# Patient Record
Sex: MALE | Race: WHITE | Employment: FULL TIME | URBAN - METROPOLITAN AREA
[De-identification: names, ages, dates, MRNs, and addresses within clinical notes are randomized per-mention and may not be internally consistent; named-entity substitution may affect disease eponyms.]

---

## 2021-02-18 ENCOUNTER — APPOINTMENT (OUTPATIENT)
Dept: CT IMAGING | Age: 66
End: 2021-02-18
Payer: COMMERCIAL

## 2021-02-18 ENCOUNTER — APPOINTMENT (OUTPATIENT)
Dept: GENERAL RADIOLOGY | Age: 66
End: 2021-02-18
Payer: COMMERCIAL

## 2021-02-18 ENCOUNTER — HOSPITAL ENCOUNTER (EMERGENCY)
Age: 66
Discharge: HOME | End: 2021-02-18
Attending: EMERGENCY MEDICINE
Payer: COMMERCIAL

## 2021-02-18 VITALS
WEIGHT: 235 LBS | HEART RATE: 71 BPM | DIASTOLIC BLOOD PRESSURE: 98 MMHG | BODY MASS INDEX: 31.83 KG/M2 | SYSTOLIC BLOOD PRESSURE: 156 MMHG | RESPIRATION RATE: 18 BRPM | HEIGHT: 72 IN | TEMPERATURE: 97.6 F | OXYGEN SATURATION: 96 %

## 2021-02-18 DIAGNOSIS — M79.601 RIGHT ARM PAIN: ICD-10-CM

## 2021-02-18 DIAGNOSIS — S00.01XA ABRASION OF SCALP, INITIAL ENCOUNTER: ICD-10-CM

## 2021-02-18 DIAGNOSIS — S09.90XA INJURY OF HEAD, INITIAL ENCOUNTER: Primary | ICD-10-CM

## 2021-02-18 DIAGNOSIS — M54.2 NECK PAIN: ICD-10-CM

## 2021-02-18 PROCEDURE — 99283 EMERGENCY DEPT VISIT LOW MDM: CPT

## 2021-02-18 PROCEDURE — 70450 CT HEAD/BRAIN W/O DYE: CPT

## 2021-02-18 PROCEDURE — 72125 CT NECK SPINE W/O DYE: CPT

## 2021-02-18 PROCEDURE — 73060 X-RAY EXAM OF HUMERUS: CPT

## 2021-02-18 RX ORDER — ONDANSETRON 4 MG/1
4 TABLET, ORALLY DISINTEGRATING ORAL EVERY 8 HOURS PRN
Qty: 10 TABLET | Refills: 0 | Status: SHIPPED | OUTPATIENT
Start: 2021-02-18

## 2021-02-18 ASSESSMENT — ENCOUNTER SYMPTOMS
TROUBLE SWALLOWING: 0
SHORTNESS OF BREATH: 0
PHOTOPHOBIA: 0
VOMITING: 0
EYE PAIN: 0
COLOR CHANGE: 0
NAUSEA: 1
FACIAL SWELLING: 0
ABDOMINAL PAIN: 0
BACK PAIN: 0
VOICE CHANGE: 0

## 2021-02-18 ASSESSMENT — PAIN SCALES - GENERAL: PAINLEVEL_OUTOF10: 4

## 2021-02-18 NOTE — ED NOTES
Pt slipped on ice and hit the back of his head. Abrasion noted to posterior head. He denies loc. He also states that his right upper arm is sore from catching himself. No other complaints. Ambulated to room without difficulty.       Jose Gloria RN  02/18/21 9735

## 2021-02-18 NOTE — DISCHARGE INSTR - COC
DME ZGNS:759641405}  Toileting  {CHP DME MLHQ:251267091}  Feeding  {CHP DME QGEH:393167565}  Med Admin  {CHP DME TDEO:876064332}  Med Delivery   { [facility] MED Delivery:031455941}    Wound Care Documentation and Therapy:        Elimination:  Continence:   · Bowel: {YES / OS:53734}  · Bladder: {YES / HN:21802}  Urinary Catheter: {Urinary Catheter:026664073}   Colostomy/Ileostomy/Ileal Conduit: {YES / RW:92894}       Date of Last BM: ***  No intake or output data in the 24 hours ending 21 1315  No intake/output data recorded.     Safety Concerns:     [address] [employer] Safety Concerns:623539972}    Impairments/Disabilities:      [address] [employer] Impairments/Disabilities:724982275}    Nutrition Therapy:  Current Nutrition Therapy:   [address] [employer] Diet List:579032693}    Routes of Feeding: {[facility] DME Other Feedings:511448801}  Liquids: {Slp liquid thickness:78461}  Daily Fluid Restriction: {CHP DME Yes amt example:254899930}  Last Modified Barium Swallow with Video (Video Swallowing Test): {Done Not Done YJRO:945701048}    Treatments at the Time of Hospital Discharge:   Respiratory Treatments: ***  Oxygen Therapy:  {Therapy; copd oxygen:40120}  Ventilator:    { CC Vent OXV}    Rehab Therapies: {THERAPEUTIC INTERVENTION:0017667490}  Weight Bearing Status/Restrictions: [employer]  Weight Bearin}  Other Medical Equipment (for information only, NOT a DME order):  {EQUIPMENT:883354034}  Other Treatments: ***    Patient's personal belongings (please select all that are sent with patient):  {[facility] DME Belongings:128768487}    RN SIGNATURE:  {Esignature:879578956}    CASE MANAGEMENT/SOCIAL WORK SECTION    Inpatient Status Date: ***    Readmission Risk Assessment Score:  Readmission Risk              Risk of Unplanned Readmission:        0           Discharging to Facility/ Agency   · Name:   · Address:  · Phone:  · Fax:    Dialysis Facility (if applicable)   · Name:  · Address:  · Dialysis Schedule:  · Phone:  · Fax:    Case Manager/ signature: {Esignature:797082177}    PHYSICIAN SECTION    Prognosis: {Prognosis:7151802366}    Condition at Discharge: [address] [facility] Patient Condition:069581205}    Rehab Potential (if transferring to Rehab): {Prognosis:9412206063}    Recommended Labs or Other Treatments After Discharge: ***    Physician Certification: I certify the above information and transfer of Tomer Henning  is necessary for the continuing treatment of the diagnosis listed and that he requires {Admit to Appropriate Level of Care:33627} for {GREATER/LESS:056534220} 30 days.      Update Admission H&P: {CHP DME Changes in JIVNH:636933003}    PHYSICIAN SIGNATURE:  {Esignature:752608343}

## 2021-02-18 NOTE — ED PROVIDER NOTES
Team [address] ED  eMERGENCY dEPARTMENT eNCOUnter      Pt Name: Andre Sandoval  MRN: [id number]  [place] 1955  Date of evaluation: 2/18/2021  Provider: TREE Del Valle CNP    CHIEF COMPLAINT       Chief Complaint   Patient presents with    Fall     fell approx 1 hour ago. slipped on ice, fell back and hit back of head. denies loc. wife was with pt. wife states he had episode of confusion that lasted almost until now. pt is currently A&Ox4 and ambulated into ER without difficulty    Head Injury     c/o head and neck pain. abrasion to back of head. bleeding controlled    Arm Injury     minor right arm pain from fall. no deformity         HISTORY OF PRESENT ILLNESS  (Location/Symptom, Timing/Onset, Context/Setting, Quality, Duration, Modifying Factors, Severity.)   Andre Sandoval is a 60 y.o. male who presents to the emergency department via private auto for a head injury, nausea, right upper arm pain, neck pain s/p slip and fall on ice this morning. He struck the back of his head on concrete; he has an abrasion. Denies LOC. Wife states he was confused for 30-45 minutes after. He was unable to navigate through his living room to find is usual chair. Denies pain to his back, chest, abdomen, elbow, shoulder, wrist. Rates his pain 4/10 at this time. States his last tetanus shot was <10 years ago. Nursing Notes were reviewed. ALLERGIES     Patient has no known allergies.     CURRENT MEDICATIONS       Discharge Medication List as of 2/18/2021 11:53 AM      CONTINUE these medications which have NOT CHANGED    Details   tamsulosin (FLOMAX) 0.4 MG capsule Take 1 capsule by mouth daily, Disp-30 capsule, R-3      lisinopril (PRINIVIL;ZESTRIL) 10 MG tablet Take 10 mg by mouth daily             PAST MEDICAL HISTORY         Diagnosis Date    Gout     Hypertension     Renal stones        SURGICAL HISTORY           Procedure Laterality Date    BACK SURGERY  2008    laminectomy lumbar    BACK SURGERY  2010    cervical fusion    BACK SURGERY  2011    lumbar fusion    COLONOSCOPY  2009    CYSTOSCOPY      with stent ESWL x 2 procedure    HERNIA REPAIR Right     inguinal hernia    HERNIA REPAIR Left 2013    inguinal hernia x 2 surgeries    KNEE ARTHROSCOPY Right     tear    LITHOTRIPSY Right 3/29/2016    SHOULDER ARTHROSCOPY Right 2005    TONSILLECTOMY           FAMILY HISTORY     History reviewed. No pertinent family history. No family status information on file. SOCIAL HISTORY      reports that he has quit smoking. He has never used smokeless tobacco. He reports current alcohol use. He reports that he does not use drugs. REVIEW OF SYSTEMS    (2-9 systems for level 4, 10 or more for level 5)     Review of Systems   Constitutional: Negative for chills, diaphoresis, fatigue and fever. HENT: Negative for facial swelling, nosebleeds, trouble swallowing and voice change. Eyes: Negative for photophobia, pain and visual disturbance. Respiratory: Negative for shortness of breath. Cardiovascular: Negative for chest pain. Gastrointestinal: Positive for nausea. Negative for abdominal pain and vomiting. Genitourinary: Negative for difficulty urinating. Musculoskeletal: Positive for myalgias and neck pain. Negative for arthralgias, back pain and gait problem. Skin: Positive for wound. Negative for color change and rash. Neurological: Positive for headaches. Negative for dizziness, syncope, facial asymmetry, speech difficulty, weakness, light-headedness and numbness. Psychiatric/Behavioral: Positive for confusion. Except as noted above the remainder of the review of systems was reviewed and negative.      PHYSICAL EXAM    (up to 7 for level 4, 8 or more for level 5)     ED Triage Vitals   BP Temp Temp Source Pulse Resp SpO2 Height Weight   02/18/21 1015 02/18/21 1013 02/18/21 1013 02/18/21 1013 02/18/21 1013 02/18/21 1013 02/18/21 1013 02/18/21 1013   (!) 156/98 97.6 °F weakness. Coordination: Coordination is intact. Gait: Gait is intact. Psychiatric:         Behavior: Behavior normal.         DIAGNOSTIC RESULTS     RADIOLOGY:   Non-plain film images such as CT, Ultrasound and MRI are read by the radiologist. Modesta Bean radiographic images are visualized and preliminarily interpreted by the emergency physician with the below findings:    Interpretation per the Radiologist below, if available at the time of this note:    Xr Humerus Right (min 2 Views)    Result Date: 2/18/2021  EXAMINATION: TWO XRAY VIEWS OF THE RIGHT HUMERUS 2/18/2021 7:34 am COMPARISON: None. HISTORY: ORDERING SYSTEM PROVIDED HISTORY: pain, fall TECHNOLOGIST PROVIDED HISTORY: pain, fall Acuity: Acute Type of Exam: Initial FINDINGS: The humerus appears intact, and the humeral head is appropriately seated in the glenoid fossa. There is sequelae of chronic medial epicondylitis. Mild glenohumeral and moderate acromioclavicular joint degenerative changes. On the lateral projection there is a slightly irregular appearance of the proximal ulna which may be projectional.     No acute fracture or dislocation of the right humerus. Slightly irregular appearance of the proximal ulna on the lateral view which may be projectional.  Correlate for any elbow pain, and if present recommend dedicated elbow radiographs. Ct Head Wo Contrast    Result Date: 2/18/2021  EXAMINATION: CT OF THE CERVICAL SPINE WITHOUT CONTRAST; CT OF THE HEAD WITHOUT CONTRAST 2/18/2021 10:52 am TECHNIQUE: CT of the cervical spine was performed without the administration of intravenous contrast. Multiplanar reformatted images are provided for review.  Dose modulation, iterative reconstruction, and/or weight based adjustment of the mA/kV was utilized to reduce the radiation dose to as low as reasonably achievable.; CT of the head was performed without the administration of intravenous contrast. Dose modulation, iterative reconstruction, and/or weight based adjustment of the mA/kV was utilized to reduce the radiation dose to as low as reasonably achievable. COMPARISON: None. HISTORY: ORDERING SYSTEM PROVIDED HISTORY: fall TECHNOLOGIST PROVIDED HISTORY: fall Decision Support Exception->Emergency Medical Condition (MA) Reason for Exam: Pt fell on ice, abrasion to back of head. No LOC, pts wife states he was having confusion after fall Acuity: Acute Type of Exam: Initial CT HEAD FINDINGS: BRAIN/VENTRICLES: There is no acute intracranial hemorrhage, mass effect or midline shift. No abnormal extra-axial fluid collection. The gray-white differentiation is maintained without evidence of an acute infarct. There is no evidence of hydrocephalus. ORBITS: The visualized portion of the orbits demonstrate no acute abnormality. SINUSES: The visualized paranasal sinuses and mastoid air cells demonstrate no acute abnormality. SOFT TISSUES/SKULL:  No acute abnormality of the visualized skull or soft tissues. CT CERVICAL SPINE FINDINGS: BONES/ALIGNMENT: No evidence of an acute cervical spine fracture. Grade 1 degenerative anterolisthesis C2 on C3, C3 on C4, C4 on C5 and C7 on T1. Status post C5-7 ACDF without hardware failure or malalignment evident. DEGENERATIVE CHANGES: Diffuse mild degenerative changes. No cervical spinal canal stenosis evident. SOFT TISSUES: There is no prevertebral soft tissue swelling. 1. CT HEAD: No acute intracranial abnormality. 2. CT CERVICAL SPINE: No acute abnormality of the cervical spine. 3. Grade 1 degenerative anterolisthesis C2 on C3, C3 on C4, C4 on C5 and C7 on T1. 4. Mild degenerative changes cervical spine. 5. Sequela from C5-7 ACDF. *Note that if pain persists or worsens, or if clinically there is concern for CT occult acute cervical abnormality, flexion/extension C-spine series or MRI cervical spine may be considered for additional evaluation.      Ct Cervical Spine Wo Contrast    Result Date: 2/18/2021  EXAMINATION: CT OF THE CERVICAL SPINE WITHOUT CONTRAST; CT OF THE HEAD WITHOUT CONTRAST 2/18/2021 10:52 am TECHNIQUE: CT of the cervical spine was performed without the administration of intravenous contrast. Multiplanar reformatted images are provided for review. Dose modulation, iterative reconstruction, and/or weight based adjustment of the mA/kV was utilized to reduce the radiation dose to as low as reasonably achievable.; CT of the head was performed without the administration of intravenous contrast. Dose modulation, iterative reconstruction, and/or weight based adjustment of the mA/kV was utilized to reduce the radiation dose to as low as reasonably achievable. COMPARISON: None. HISTORY: ORDERING SYSTEM PROVIDED HISTORY: fall TECHNOLOGIST PROVIDED HISTORY: fall Decision Support Exception->Emergency Medical Condition (MA) Reason for Exam: Pt fell on ice, abrasion to back of head. No LOC, pts wife states he was having confusion after fall Acuity: Acute Type of Exam: Initial CT HEAD FINDINGS: BRAIN/VENTRICLES: There is no acute intracranial hemorrhage, mass effect or midline shift. No abnormal extra-axial fluid collection. The gray-white differentiation is maintained without evidence of an acute infarct. There is no evidence of hydrocephalus. ORBITS: The visualized portion of the orbits demonstrate no acute abnormality. SINUSES: The visualized paranasal sinuses and mastoid air cells demonstrate no acute abnormality. SOFT TISSUES/SKULL:  No acute abnormality of the visualized skull or soft tissues. CT CERVICAL SPINE FINDINGS: BONES/ALIGNMENT: No evidence of an acute cervical spine fracture. Grade 1 degenerative anterolisthesis C2 on C3, C3 on C4, C4 on C5 and C7 on T1. Status post C5-7 ACDF without hardware failure or malalignment evident. DEGENERATIVE CHANGES: Diffuse mild degenerative changes. No cervical spinal canal stenosis evident. SOFT TISSUES: There is no prevertebral soft tissue swelling.      1. CT HEAD: No acute intracranial abnormality. 2. CT CERVICAL SPINE: No acute abnormality of the cervical spine. 3. Grade 1 degenerative anterolisthesis C2 on C3, C3 on C4, C4 on C5 and C7 on T1. 4. Mild degenerative changes cervical spine. 5. Sequela from C5-7 ACDF. *Note that if pain persists or worsens, or if clinically there is concern for CT occult acute cervical abnormality, flexion/extension C-spine series or MRI cervical spine may be considered for additional evaluation. EMERGENCY DEPARTMENT COURSE and DIFFERENTIAL DIAGNOSIS/MDM:   Vitals:    Vitals:    02/18/21 1013 02/18/21 1015   BP:  (!) 156/98   Pulse: 71    Resp: 18    Temp: 97.6 °F (36.4 °C)    TempSrc: Oral    SpO2: 96%    Weight: 235 lb (106.6 kg)    Height: 6' (1.829 m)        CLINICAL DECISION MAKING:  The patient presented alert with a nontoxic appearance and was seen in conjunction with Dr. Jordy Cha. Imaging was negative for acute findings. His wound was cleansed. A prescription was written for Zofran. Follow up with pcp, return to ED if condition worsens. He was discharged to family. Adult Head Trauma > 25years of age:   A head CT was ordered by an emergency care provider, and some of the indications for ordering the head CT included:     #415 - Emergency Medicine: Emergency Department Utilization of CT for Minor Blunt Head Trauma (Adults)   1.  A head CT was ordered due to (select 1):  []Aged 65 years or older []GCS less than 15 []Focal neurological deficit [x]Severe headache []Vomiting   []Dangerous mechanism of injury []Physical signs of basilar skull fracture []Patient suspected of taking anticoagulant medication []Thrombocytopenia []Coagulopathy   OR  Select either:     []Loss of Consciousness       OR        []Post-traumatic Amnesia  AND 1 indication below:   [x]Headache [x]Short term memory deficits []Drug/alcohol intoxication [x]Evidence of trauma above the clavicles []Age 60 years or older []Post-traumatic seizure   If Answered, Stop Here    2. Measure exclusions - Click all diagnoses that apply:  []Ventricular shunt []Brain tumor []Pregnant   []Multi-system trauma []Patient is taking antiplatelet medication (excluding aspirin)   If Answered, Stop Here       FINAL IMPRESSION      1. Injury of head, initial encounter    2. Abrasion of scalp, initial encounter    3. Neck pain    4.  Right arm pain              DISPOSITION/PLAN   DISPOSITION Decision To Discharge 02/18/2021 11:52:06 AM      PATIENT REFERRED TO:   Sterling Clements MD  [address] [place] [address]  [phone]    Schedule an appointment as soon as possible for a visit       [facility]  [address]  [phone]    If symptoms worsen, As needed      DISCHARGE MEDICATIONS:     Discharge Medication List as of 2/18/2021 11:53 AM      START taking these medications    Details   ondansetron (ZOFRAN ODT) 4 MG disintegrating tablet Take 1 tablet by mouth every 8 hours as needed for Nausea or Vomiting, Disp-10 tablet, R-0Print                 (Please note that portions of this note were completed with a voice recognition program.  Efforts were made to edit the dictations but occasionally words are mis-transcribed.)    TREE Pike CNP, APRN - CNP  02/18/21 0941

## 2021-02-20 NOTE — ED PROVIDER NOTES
eMERGENCY dEPARTMENT eNCOUnter   Independent Attestation     Pt Name: Ketty Ivey  MRN: [id number]  [place] 1955  Date of evaluation: 2/20/21     Ketty Ivey is a 60 y.o. male with CC: Fall (fell approx 1 hour ago. slipped on ice, fell back and hit back of head. denies loc. wife was with pt. wife states he had episode of confusion that lasted almost until now. pt is currently A&Ox4 and ambulated into ER without difficulty), Head Injury (c/o head and neck pain. abrasion to back of head. bleeding controlled), and Arm Injury (minor right arm pain from fall. no deformity)      Based on the medical record the care appears appropriate. I was personally available for consultation in the Emergency Department.     Jesse Green MD  Attending Emergency Physician                    Jesse Green MD  02/20/21 7050

## 2022-01-10 ENCOUNTER — HOSPITAL ENCOUNTER (EMERGENCY)
Age: 67
Discharge: HOME | End: 2022-01-10
Attending: EMERGENCY MEDICINE
Payer: COMMERCIAL

## 2022-01-10 VITALS
TEMPERATURE: 98.2 F | HEIGHT: 72 IN | DIASTOLIC BLOOD PRESSURE: 98 MMHG | RESPIRATION RATE: 16 BRPM | HEART RATE: 87 BPM | SYSTOLIC BLOOD PRESSURE: 157 MMHG | OXYGEN SATURATION: 99 % | BODY MASS INDEX: 35.21 KG/M2 | WEIGHT: 260 LBS

## 2022-01-10 DIAGNOSIS — M1A.0720 CHRONIC GOUT OF LEFT FOOT, UNSPECIFIED CAUSE: Primary | ICD-10-CM

## 2022-01-10 PROCEDURE — 96372 THER/PROPH/DIAG INJ SC/IM: CPT

## 2022-01-10 PROCEDURE — 6370000000 HC RX 637 (ALT 250 FOR IP): Performed by: EMERGENCY MEDICINE

## 2022-01-10 PROCEDURE — 99283 EMERGENCY DEPT VISIT LOW MDM: CPT

## 2022-01-10 PROCEDURE — 6360000002 HC RX W HCPCS: Performed by: EMERGENCY MEDICINE

## 2022-01-10 RX ORDER — METOPROLOL SUCCINATE 25 MG/1
25 TABLET, EXTENDED RELEASE ORAL DAILY
COMMUNITY
End: 2022-10-13 | Stop reason: SDUPTHER

## 2022-01-10 RX ORDER — KETOROLAC TROMETHAMINE 30 MG/ML
30 INJECTION, SOLUTION INTRAMUSCULAR; INTRAVENOUS ONCE
Status: COMPLETED | OUTPATIENT
Start: 2022-01-10 | End: 2022-01-10

## 2022-01-10 RX ORDER — DULOXETIN HYDROCHLORIDE 20 MG/1
20 CAPSULE, DELAYED RELEASE ORAL DAILY
COMMUNITY

## 2022-01-10 RX ORDER — PREDNISONE 50 MG/1
50 TABLET ORAL DAILY
Qty: 5 TABLET | Refills: 0 | Status: SHIPPED | OUTPATIENT
Start: 2022-01-10 | End: 2022-01-15

## 2022-01-10 RX ORDER — ACETAMINOPHEN 500 MG
1000 TABLET ORAL ONCE
Status: COMPLETED | OUTPATIENT
Start: 2022-01-10 | End: 2022-01-10

## 2022-01-10 RX ORDER — PREDNISONE 20 MG/1
50 TABLET ORAL ONCE
Status: COMPLETED | OUTPATIENT
Start: 2022-01-10 | End: 2022-01-10

## 2022-01-10 RX ADMIN — KETOROLAC TROMETHAMINE 30 MG: 30 INJECTION, SOLUTION INTRAMUSCULAR; INTRAVENOUS at 05:50

## 2022-01-10 RX ADMIN — ACETAMINOPHEN 1000 MG: 500 TABLET ORAL at 05:49

## 2022-01-10 RX ADMIN — PREDNISONE 50 MG: 20 TABLET ORAL at 05:49

## 2022-01-10 ASSESSMENT — PAIN SCALES - GENERAL: PAINLEVEL_OUTOF10: 9

## 2022-01-10 NOTE — ED PROVIDER NOTES
EMERGENCY DEPARTMENT ENCOUNTER    Pt Name: Cornelius Awad  MRN: [id number]  [place] 1955  Date of evaluation: 1/10/22  CHIEF COMPLAINT       Chief Complaint   Patient presents with    Foot Pain     treated for gout    Leg Swelling     HISTORY OF PRESENT ILLNESS   Patient is a 78-year-old male with PMH of gout who presents to the ED complaining of pain and swelling to left foot secondary to gout attack. Symptoms started approximately 9 weeks ago. He took a course of steroids with moderate improvement. He reports having gout in both legs, right foot has healed however left foot will not resolve completely. No other issues at this time. ROS:  No fevers, cough, shortness of breath, chest pain, abdominal pain, nausea, vomiting, changes in urine or stool. REVIEW OF SYSTEMS     Review of Systems   All other systems reviewed and are negative.     PASTMEDICAL HISTORY     Past Medical History:   Diagnosis Date    Gout     Hypertension     Renal stones      SURGICAL HISTORY       Past Surgical History:   Procedure Laterality Date    BACK SURGERY  2008    laminectomy lumbar    BACK SURGERY  2010    cervical fusion    BACK SURGERY  2011    lumbar fusion    COLONOSCOPY  2009    CYSTOSCOPY      with stent ESWL x 2 procedure    HERNIA REPAIR Right     inguinal hernia    HERNIA REPAIR Left 2013    inguinal hernia x 2 surgeries    KNEE ARTHROSCOPY Right     tear    LITHOTRIPSY Right 3/29/2016    SHOULDER ARTHROSCOPY Right 2005    TONSILLECTOMY       CURRENT MEDICATIONS       Previous Medications    ALLOPURINOL PO    Take by mouth    DULOXETINE (CYMBALTA) 20 MG EXTENDED RELEASE CAPSULE    Take 20 mg by mouth daily    LISINOPRIL (PRINIVIL;ZESTRIL) 10 MG TABLET    Take 10 mg by mouth daily    METOPROLOL SUCCINATE (TOPROL XL) 25 MG EXTENDED RELEASE TABLET    Take 25 mg by mouth daily    MULTIPLE VITAMINS-MINERALS (MULTIVITAL PO)    Take 1 tablet by mouth daily    NONFORMULARY    tropsium    ONDANSETRON (ZOFRAN ODT) 4 MG DISINTEGRATING TABLET    Take 1 tablet by mouth every 8 hours as needed for Nausea or Vomiting    TAMSULOSIN (FLOMAX) 0.4 MG CAPSULE    Take 1 capsule by mouth daily     ALLERGIES     has No Known Allergies. FAMILY HISTORY     has no family status information on file. SOCIAL HISTORY       Social History     Tobacco Use    Smoking status: Former Smoker    Smokeless tobacco: Never Used   Substance Use Topics    Alcohol use: Yes     Comment: social    Drug use: No     PHYSICAL EXAM     INITIAL VITALS: BP (!) 157/98   Pulse 87   Temp 98.2 °F (36.8 °C) (Oral)   Resp 16   Ht 6' (1.829 m)   Wt 260 lb (117.9 kg)   SpO2 99%   BMI 35.26 kg/m²    Physical Exam  HENT:      Head: Normocephalic. Right Ear: External ear normal.      Left Ear: External ear normal.      Nose: Nose normal.   Eyes:      Conjunctiva/sclera: Conjunctivae normal.   Cardiovascular:      Rate and Rhythm: Normal rate. Pulmonary:      Effort: Pulmonary effort is normal.   Abdominal:      General: Abdomen is flat. Skin:     General: Skin is dry. Comments: Swollen, tender, left foot primarily over medial aspect of first digit and arch. Neurological:      Mental Status: He is alert. Mental status is at baseline. Psychiatric:         Mood and Affect: Mood normal.         Behavior: Behavior normal.         MEDICAL DECISION MAKING:   The patient is hemodynamically stable, afebrile, nontoxic-appearing. Physical exam notable for tenderness, swelling medial aspect left foot. Based on history and exam likely acute on chronic gout. ED plan for analgesia, steroids, discharge.            DIAGNOSTIC RESULTS   EKG:All EKG's are interpreted by the Emergency Department Physician who either signs or Co-signs this chart in the absence of a cardiologist.        RADIOLOGY:All plain film, CT, MRI, and formal ultrasound images (except ED bedside ultrasound) are read by the radiologist, see reports below, unless otherwisenoted in [facility] or here. No orders to display     LABS: All lab results were reviewed by myself, and all abnormals are listed below. Labs Reviewed - No data to display    EMERGENCY DEPARTMENTCOURSE:   Patient did well in the ED. Given Toradol, prednisone, Tylenol in the ED. Given Rx for prednisone. Given referral to podiatry. No further work-up indicated at this time. Nursing notes reviewed. At this time this is what I find, the patient appears well and does not appear sick or toxic. I gave my usual and customary discussion of the risks and benefits of discharge versus admission. I answered the patient's questions. I gave the patient strict return precautions. Patient expressed understanding of the discharge instructions. The care is provided during an unprecedented national emergency due to the novel coronavirus, COVID-19. Dictated but not reviewed. Vitals:    Vitals:    01/10/22 0442 01/10/22 0448 01/10/22 0450   BP:   (!) 157/98   Pulse: 87     Resp: 16     Temp:  98.2 °F (36.8 °C)    TempSrc:  Oral    SpO2: 99%     Weight: 260 lb (117.9 kg)     Height: 6' (1.829 m)         The patient was given the following medications while in the emergency department:  Orders Placed This Encounter   Medications    predniSONE (DELTASONE) tablet 50 mg    ketorolac (TORADOL) injection 30 mg    acetaminophen (TYLENOL) tablet 1,000 mg    predniSONE (DELTASONE) 50 MG tablet     Sig: Take 1 tablet by mouth daily for 5 days     Dispense:  5 tablet     Refill:  0     CONSULTS:  None    FINAL IMPRESSION      1.  Chronic gout of left foot, unspecified cause          DISPOSITION/PLAN   DISPOSITION Decision To Discharge 01/10/2022 05:31:26 AM      PATIENT REFERRED TO:  Noe Michelle DPM  [address] [facility] [address] [phone]    In 2 days      DISCHARGE MEDICATIONS:  New Prescriptions    PREDNISONE (DELTASONE) 50 MG TABLET    Take 1 tablet by mouth daily for 5 days     Carlos Eduardo Bateamn MD  Attending Emergency Physician                   Ender Helton MD  01/10/22 9090

## 2022-10-13 ENCOUNTER — HOSPITAL ENCOUNTER (EMERGENCY)
Age: 67
Discharge: HOME | End: 2022-10-13
Attending: EMERGENCY MEDICINE
Payer: COMMERCIAL

## 2022-10-13 VITALS
TEMPERATURE: 97.6 F | HEART RATE: 73 BPM | DIASTOLIC BLOOD PRESSURE: 116 MMHG | SYSTOLIC BLOOD PRESSURE: 159 MMHG | BODY MASS INDEX: 29.8 KG/M2 | HEIGHT: 72 IN | OXYGEN SATURATION: 95 % | WEIGHT: 220 LBS | RESPIRATION RATE: 16 BRPM

## 2022-10-13 DIAGNOSIS — I10 HYPERTENSION, UNSPECIFIED TYPE: Primary | ICD-10-CM

## 2022-10-13 LAB
ABSOLUTE EOS #: 0.11 K/UL (ref 0–0.44)
ABSOLUTE IMMATURE GRANULOCYTE: 0.05 K/UL (ref 0–0.3)
ABSOLUTE LYMPH #: 2.27 K/UL (ref 1.1–3.7)
ABSOLUTE MONO #: 0.68 K/UL (ref 0.1–1.2)
ANION GAP SERPL CALCULATED.3IONS-SCNC: 13 MMOL/L (ref 9–17)
BASOPHILS # BLD: 1 % (ref 0–2)
BASOPHILS ABSOLUTE: 0.06 K/UL (ref 0–0.2)
BUN BLDV-MCNC: 12 MG/DL (ref 8–23)
BUN/CREAT BLD: 11 (ref 9–20)
CALCIUM SERPL-MCNC: 10.2 MG/DL (ref 8.6–10.4)
CHLORIDE BLD-SCNC: 96 MMOL/L (ref 98–107)
CO2: 24 MMOL/L (ref 20–31)
CREAT SERPL-MCNC: 1.09 MG/DL (ref 0.7–1.2)
EOSINOPHILS RELATIVE PERCENT: 2 % (ref 1–4)
GFR SERPL CREATININE-BSD FRML MDRD: >60 ML/MIN/1.73M2
GLUCOSE BLD-MCNC: 86 MG/DL (ref 70–99)
HCT VFR BLD CALC: 41.8 % (ref 40.7–50.3)
HEMOGLOBIN: 13.6 G/DL (ref 13–17)
IMMATURE GRANULOCYTES: 1 %
LYMPHOCYTES # BLD: 32 % (ref 24–43)
MCH RBC QN AUTO: 33.8 PG (ref 25.2–33.5)
MCHC RBC AUTO-ENTMCNC: 32.5 G/DL (ref 28.4–34.8)
MCV RBC AUTO: 104 FL (ref 82.6–102.9)
MONOCYTES # BLD: 10 % (ref 3–12)
NRBC AUTOMATED: 0 PER 100 WBC
PDW BLD-RTO: 13.2 % (ref 11.8–14.4)
PLATELET # BLD: 212 K/UL (ref 138–453)
PMV BLD AUTO: 10.6 FL (ref 8.1–13.5)
POTASSIUM SERPL-SCNC: 4.3 MMOL/L (ref 3.7–5.3)
RBC # BLD: 4.02 M/UL (ref 4.21–5.77)
RBC # BLD: ABNORMAL 10*6/UL
SEG NEUTROPHILS: 54 % (ref 36–65)
SEGMENTED NEUTROPHILS ABSOLUTE COUNT: 3.94 K/UL (ref 1.5–8.1)
SODIUM BLD-SCNC: 133 MMOL/L (ref 135–144)
WBC # BLD: 7.1 K/UL (ref 3.5–11.3)

## 2022-10-13 PROCEDURE — 93005 ELECTROCARDIOGRAM TRACING: CPT | Performed by: PHYSICIAN ASSISTANT

## 2022-10-13 PROCEDURE — 80048 BASIC METABOLIC PNL TOTAL CA: CPT

## 2022-10-13 PROCEDURE — 99284 EMERGENCY DEPT VISIT MOD MDM: CPT

## 2022-10-13 PROCEDURE — 6370000000 HC RX 637 (ALT 250 FOR IP): Performed by: PHYSICIAN ASSISTANT

## 2022-10-13 PROCEDURE — 85025 COMPLETE CBC W/AUTO DIFF WBC: CPT

## 2022-10-13 PROCEDURE — 2500000003 HC RX 250 WO HCPCS: Performed by: PHYSICIAN ASSISTANT

## 2022-10-13 PROCEDURE — 96374 THER/PROPH/DIAG INJ IV PUSH: CPT

## 2022-10-13 RX ORDER — METOPROLOL SUCCINATE 25 MG/1
25 TABLET, EXTENDED RELEASE ORAL DAILY
Qty: 30 TABLET | Refills: 0 | Status: SHIPPED | OUTPATIENT
Start: 2022-10-13

## 2022-10-13 RX ORDER — ATORVASTATIN CALCIUM 80 MG/1
80 TABLET, FILM COATED ORAL DAILY
COMMUNITY

## 2022-10-13 RX ORDER — FENOFIBRATE 145 MG/1
145 TABLET, COATED ORAL DAILY
COMMUNITY

## 2022-10-13 RX ORDER — METOPROLOL TARTRATE 5 MG/5ML
5 INJECTION INTRAVENOUS ONCE
Status: COMPLETED | OUTPATIENT
Start: 2022-10-13 | End: 2022-10-13

## 2022-10-13 RX ORDER — HYDROCHLOROTHIAZIDE 25 MG/1
25 TABLET ORAL DAILY
COMMUNITY

## 2022-10-13 RX ADMIN — METOPROLOL TARTRATE 5 MG: 1 INJECTION, SOLUTION INTRAVENOUS at 16:54

## 2022-10-13 RX ADMIN — METOPROLOL TARTRATE 50 MG: 25 TABLET, FILM COATED ORAL at 19:07

## 2022-10-13 ASSESSMENT — PAIN SCALES - GENERAL: PAINLEVEL_OUTOF10: 8

## 2022-10-13 ASSESSMENT — PAIN - FUNCTIONAL ASSESSMENT
PAIN_FUNCTIONAL_ASSESSMENT: NONE - DENIES PAIN
PAIN_FUNCTIONAL_ASSESSMENT: 0-10

## 2022-10-13 ASSESSMENT — PAIN DESCRIPTION - ORIENTATION: ORIENTATION: LEFT

## 2022-10-13 ASSESSMENT — PAIN DESCRIPTION - LOCATION: LOCATION: SHOULDER

## 2022-10-13 NOTE — ED PROVIDER NOTES
eMERGENCY dEPARTMENT eNCOUnter   Independent Attestation     Pt Name: Abdelrahman Magallon  MRN: [id number]  [place] 1955  Date of evaluation: 10/13/22     Abdelrahman Magallon is a 78 y.o. male with CC: Hypertension (Was taken off BP meds last month with PCP. Has been monitoring BP at home and it has been . States the last week or so at doctor's appts, it has been increased 140s/90s. )        This visit was performed by both a physician and an APC. I performed all aspects of the MDM as documented.       The care is provided during an unprecedented national emergency due to the novel coronavirus, Sonia Garcia MD  Attending Emergency Physician            Lisa Rasmussen MD  10/13/22 1828

## 2022-10-13 NOTE — ED PROVIDER NOTES
[address] [place] [address] ED  [facility]      Pt Name: Yadira Urbina  MRN: [id number]  [place] 1955  Date ofevaluation: 10/13/2022  Provider: Najma Smith PA-C    CHIEF COMPLAINT       Chief Complaint   Patient presents with    Hypertension     Was taken off BP meds last month with PCP. Has been monitoring BP at home and it has been . States the last week or so at doctor's appts, it has been increased 140s/90s. HISTORY OF PRESENT ILLNESS  (Location/Symptom, Timing/Onset, Context/Setting, Quality, Duration, Modifying Factors, Severity.)   Yadira Urbina is a 78 y.o. male who presents to the emergency department with high blood pressure. Patient states he was taken off his metoprolol a couple of months ago. He was seen at the preop area for foot surgery was blood pressure was too high was sent to the ER. Patient denies any chest pain shortness of breath. No nausea vomiting. No fevers or chills. No other complaints. Nursing Notes were reviewed. ALLERGIES     Patient has no known allergies.     CURRENT MEDICATIONS       Discharge Medication List as of 10/13/2022  6:30 PM        CONTINUE these medications which have NOT CHANGED    Details   atorvastatin (LIPITOR) 80 MG tablet Take 80 mg by mouth dailyHistorical Med      fenofibrate (TRICOR) 145 MG tablet Take 145 mg by mouth dailyHistorical Med      hydroCHLOROthiazide (HYDRODIURIL) 25 MG tablet Take 25 mg by mouth dailyHistorical Med      DULoxetine (CYMBALTA) 20 MG extended release capsule Take 20 mg by mouth dailyHistorical Med      Multiple Vitamins-Minerals (MULTIVITAL PO) Take 1 tablet by mouth dailyHistorical Med      ALLOPURINOL PO Take by mouthHistorical Med      NONFORMULARY tropsiumHistorical Med      ondansetron (ZOFRAN ODT) 4 MG disintegrating tablet Take 1 tablet by mouth every 8 hours as needed for Nausea or Vomiting, Disp-10 tablet, R-0Print      tamsulosin (FLOMAX) 0.4 MG capsule Take 1 capsule by mouth daily, Disp-30 capsule, R-3      lisinopril (PRINIVIL;ZESTRIL) 10 MG tablet Take 10 mg by mouth daily             PAST MEDICAL HISTORY         Diagnosis Date    Gout     Hypertension     Renal stones        SURGICAL HISTORY           Procedure Laterality Date    BACK SURGERY  2008    laminectomy lumbar    BACK SURGERY  2010    cervical fusion    BACK SURGERY  2011    lumbar fusion    COLONOSCOPY  2009    CYSTOSCOPY      with stent ESWL x 2 procedure    HERNIA REPAIR Right     inguinal hernia    HERNIA REPAIR Left 2013    inguinal hernia x 2 surgeries    KNEE ARTHROSCOPY Right     tear    LITHOTRIPSY Right 3/29/2016    SHOULDER ARTHROSCOPY Right 2005    TONSILLECTOMY           HISTORY     History reviewed. No pertinent family history. No family status information on file. SOCIAL HISTORY      reports that he has quit smoking. He has never used smokeless tobacco. He reports current alcohol use. He reports that he does not use drugs. REVIEW OFSYSTEMS    (2-9 systems for level 4, 10 or more for level 5)   Review of Systems    Except as noted above the remainder of the review of systems was reviewed and negative. PHYSICAL EXAM    (up to 7 for level 4, 8 or more for level 5)     ED Triage Vitals [10/13/22 1547]   BP Temp Temp Source Heart Rate Resp SpO2 Height Weight   (!) 191/132 97.6 °F (36.4 °C) Oral 97 16 100 % 6' (1.829 m) 220 lb (99.8 kg)     Physical Exam  Constitutional:       Appearance: He is well-developed. HENT:      Head: Normocephalic and atraumatic. Cardiovascular:      Rate and Rhythm: Normal rate and regular rhythm. Pulmonary:      Effort: Pulmonary effort is normal.      Breath sounds: Normal breath sounds. Abdominal:      Palpations: Abdomen is soft. Musculoskeletal:         General: Normal range of motion. Left shoulder: Tenderness present. Cervical back: Normal range of motion and neck supple. Skin:     General: Skin is warm.    Neurological:      Mental Status: He is alert and oriented to person, place, and time. Psychiatric:         Behavior: Behavior normal.               DIAGNOSTIC RESULTS     EKG: All EKG's are interpreted by the Emergency Department Physician who either signs or Co-signs this chart in the absence of a cardiologist.        RADIOLOGY:   Non-plain film images such as CT, Ultrasound and MRI are read by the radiologist. Plain radiographic images arevisualized and preliminarily interpreted by the emergency physician with the below findings:        Interpretation per the Radiologist below, if available at thetime of this note:          ED BEDSIDE ULTRASOUND:   Performed by ED Physician - none    LABS:  Labs Reviewed   CBC WITH AUTO DIFFERENTIAL - Abnormal; Notable for the following components:       Result Value    RBC 4.02 (*)     .0 (*)     MCH 33.8 (*)     Immature Granulocytes 1 (*)     All other components within normal limits   BASIC METABOLIC PANEL - Abnormal; Notable for the following components:    Sodium 133 (*)     Chloride 96 (*)     All other components within normal limits       All other labs were within normal range or not returned as of this dictation. EMERGENCY DEPARTMENT COURSE and DIFFERENTIAL DIAGNOSIS/MDM:   Vitals:    Vitals:    10/13/22 1730 10/13/22 1745 10/13/22 1830 10/13/22 1900   BP: (!) 165/114 (!) 160/119 (!) 166/113 (!) 159/116   Pulse: 69 68 69 73   Resp: (!) 9 12 (!) 8 16   Temp:       TempSrc:       SpO2: 95%      Weight:       Height:       Has chronic left shoulder pain secondary to recent rotator cuff injury. Pain reproducible with palpation and movement. Blood pressure improved after Lopressor. We will restart metoprolol discharged home outpatient follow-up. Pre-hypertension/Hypertension: The patient has been informed that they may have pre-hypertension or Hypertension based on a blood pressure reading in the emergency department.  I recommend that the patient call the primary care provider listed on their discharge instructions or a physician of their choice this week to arrange follow up for further evaluation of possible pre-hypertension or Hypertension. CONSULTS:  None    PROCEDURES:  Procedures        FINAL IMPRESSION      1.  Hypertension, unspecified type          DISPOSITION/PLAN   DISPOSITION Decision To Discharge 10/13/2022 06:28:58 PM      PATIENTREFERRED TO:   Padmini Callahan MD  [address] [place] [address] [phone]    In 3 days      DISCHARGE MEDICATIONS:     Discharge Medication List as of 10/13/2022  6:30 PM              (Please note that portions of this note were completed with a voice recognition program.  Efforts were made to edit thedictations but occasionally words are mis-transcribed.)    YOSEF Garcia PA-C  10/13/22 1932

## 2022-10-14 LAB
EKG ATRIAL RATE: 95 BPM
EKG P AXIS: 0 DEGREES
EKG P-R INTERVAL: 164 MS
EKG Q-T INTERVAL: 384 MS
EKG QRS DURATION: 96 MS
EKG QTC CALCULATION (BAZETT): 482 MS
EKG R AXIS: -6 DEGREES
EKG T AXIS: 32 DEGREES
EKG VENTRICULAR RATE: 95 BPM

## 2022-10-14 PROCEDURE — 93010 ELECTROCARDIOGRAM REPORT: CPT | Performed by: INTERNAL MEDICINE

## 2022-10-15 ENCOUNTER — HOSPITAL ENCOUNTER (EMERGENCY)
Age: 67
Discharge: HOME | End: 2022-10-15
Attending: EMERGENCY MEDICINE
Payer: COMMERCIAL

## 2022-10-15 ENCOUNTER — APPOINTMENT (OUTPATIENT)
Dept: GENERAL RADIOLOGY | Age: 67
End: 2022-10-15
Payer: COMMERCIAL

## 2022-10-15 VITALS
HEART RATE: 58 BPM | HEIGHT: 72 IN | SYSTOLIC BLOOD PRESSURE: 111 MMHG | RESPIRATION RATE: 19 BRPM | DIASTOLIC BLOOD PRESSURE: 77 MMHG | TEMPERATURE: 98.2 F | BODY MASS INDEX: 29.8 KG/M2 | WEIGHT: 220 LBS | OXYGEN SATURATION: 94 %

## 2022-10-15 DIAGNOSIS — R55 SYNCOPE AND COLLAPSE: Primary | ICD-10-CM

## 2022-10-15 LAB
ABSOLUTE EOS #: 0.17 K/UL (ref 0–0.44)
ABSOLUTE IMMATURE GRANULOCYTE: 0.07 K/UL (ref 0–0.3)
ABSOLUTE LYMPH #: 3.47 K/UL (ref 1.1–3.7)
ABSOLUTE MONO #: 1.02 K/UL (ref 0.1–1.2)
ANION GAP SERPL CALCULATED.3IONS-SCNC: 17 MMOL/L (ref 9–17)
BASOPHILS # BLD: 1 % (ref 0–2)
BASOPHILS ABSOLUTE: 0.08 K/UL (ref 0–0.2)
BUN BLDV-MCNC: 17 MG/DL (ref 8–23)
BUN/CREAT BLD: 11 (ref 9–20)
CALCIUM SERPL-MCNC: 9.8 MG/DL (ref 8.6–10.4)
CHLORIDE BLD-SCNC: 96 MMOL/L (ref 98–107)
CO2: 21 MMOL/L (ref 20–31)
CREAT SERPL-MCNC: 1.51 MG/DL (ref 0.7–1.2)
EOSINOPHILS RELATIVE PERCENT: 2 % (ref 1–4)
GFR SERPL CREATININE-BSD FRML MDRD: 51 ML/MIN/1.73M2
GLUCOSE BLD-MCNC: 118 MG/DL (ref 70–99)
HCT VFR BLD CALC: 41.3 % (ref 40.7–50.3)
HEMOGLOBIN: 13.4 G/DL (ref 13–17)
IMMATURE GRANULOCYTES: 1 %
LYMPHOCYTES # BLD: 36 % (ref 24–43)
MAGNESIUM: 1.9 MG/DL (ref 1.6–2.6)
MCH RBC QN AUTO: 34.4 PG (ref 25.2–33.5)
MCHC RBC AUTO-ENTMCNC: 32.4 G/DL (ref 28.4–34.8)
MCV RBC AUTO: 106.2 FL (ref 82.6–102.9)
MONOCYTES # BLD: 11 % (ref 3–12)
NRBC AUTOMATED: 0 PER 100 WBC
PDW BLD-RTO: 13.1 % (ref 11.8–14.4)
PLATELET # BLD: 242 K/UL (ref 138–453)
PMV BLD AUTO: 10.7 FL (ref 8.1–13.5)
POTASSIUM SERPL-SCNC: 3.8 MMOL/L (ref 3.7–5.3)
RBC # BLD: 3.89 M/UL (ref 4.21–5.77)
RBC # BLD: ABNORMAL 10*6/UL
SEG NEUTROPHILS: 49 % (ref 36–65)
SEGMENTED NEUTROPHILS ABSOLUTE COUNT: 4.9 K/UL (ref 1.5–8.1)
SODIUM BLD-SCNC: 134 MMOL/L (ref 135–144)
TROPONIN, HIGH SENSITIVITY: 18 NG/L (ref 0–22)
TROPONIN, HIGH SENSITIVITY: 19 NG/L (ref 0–22)
WBC # BLD: 9.7 K/UL (ref 3.5–11.3)

## 2022-10-15 PROCEDURE — 84484 ASSAY OF TROPONIN QUANT: CPT

## 2022-10-15 PROCEDURE — 80048 BASIC METABOLIC PNL TOTAL CA: CPT

## 2022-10-15 PROCEDURE — 83735 ASSAY OF MAGNESIUM: CPT

## 2022-10-15 PROCEDURE — 85025 COMPLETE CBC W/AUTO DIFF WBC: CPT

## 2022-10-15 PROCEDURE — 93005 ELECTROCARDIOGRAM TRACING: CPT | Performed by: EMERGENCY MEDICINE

## 2022-10-15 PROCEDURE — 2580000003 HC RX 258: Performed by: EMERGENCY MEDICINE

## 2022-10-15 PROCEDURE — 71045 X-RAY EXAM CHEST 1 VIEW: CPT

## 2022-10-15 PROCEDURE — 36415 COLL VENOUS BLD VENIPUNCTURE: CPT

## 2022-10-15 PROCEDURE — 99285 EMERGENCY DEPT VISIT HI MDM: CPT

## 2022-10-15 RX ORDER — 0.9 % SODIUM CHLORIDE 0.9 %
1000 INTRAVENOUS SOLUTION INTRAVENOUS ONCE
Status: COMPLETED | OUTPATIENT
Start: 2022-10-15 | End: 2022-10-15

## 2022-10-15 RX ADMIN — SODIUM CHLORIDE 1000 ML: 9 INJECTION, SOLUTION INTRAVENOUS at 19:34

## 2022-10-15 ASSESSMENT — ENCOUNTER SYMPTOMS
DIARRHEA: 0
NAUSEA: 0
VOMITING: 0
COLOR CHANGE: 0
SORE THROAT: 0
RHINORRHEA: 0
SHORTNESS OF BREATH: 0
COUGH: 0
EYE REDNESS: 0
EYE DISCHARGE: 0

## 2022-10-15 ASSESSMENT — PAIN - FUNCTIONAL ASSESSMENT: PAIN_FUNCTIONAL_ASSESSMENT: 0-10

## 2022-10-15 ASSESSMENT — PAIN SCALES - GENERAL: PAINLEVEL_OUTOF10: 0

## 2022-10-15 NOTE — ED PROVIDER NOTES
EMERGENCY DEPARTMENT ENCOUNTER    Pt Name: Suzy Pringle  MRN: [id number]  [place] 1955  Date of evaluation: 10/15/22  CHIEF COMPLAINT       Chief Complaint   Patient presents with    Loss of Consciousness     Syncopal episode at dinner, took new BP medications today     HISTORY OF PRESENT ILLNESS   This is a 16-year-old male that presents with complaints of a syncopal episode. Patient was at dinner this evening, he states he does not drink very often, he had a few alcoholic beverages, he got up to leave he became dizzy and passed out. Patient denies any chest pain, he has no shortness of breath, denies any nausea or vomiting, he did have some dizziness. Patient was recently seen here for hypertension, he was started on metoprolol. REVIEW OF SYSTEMS     Review of Systems   Constitutional:  Negative for chills and fever. HENT:  Negative for rhinorrhea and sore throat. Eyes:  Negative for discharge, redness and visual disturbance. Respiratory:  Negative for cough and shortness of breath. Cardiovascular:  Negative for chest pain, palpitations and leg swelling. Gastrointestinal:  Negative for diarrhea, nausea and vomiting. Genitourinary:  Negative for dysuria and hematuria. Musculoskeletal:  Negative for arthralgias, myalgias and neck pain. Skin:  Negative for color change and rash. Neurological:  Positive for dizziness and syncope. Negative for seizures, weakness and headaches. Psychiatric/Behavioral:  Negative for hallucinations, self-injury and suicidal ideas.     PASTMEDICAL HISTORY     Past Medical History:   Diagnosis Date    Gout     Hypertension     Renal stones      SURGICAL HISTORY       Past Surgical History:   Procedure Laterality Date    BACK SURGERY  2008    laminectomy lumbar    BACK SURGERY  2010    cervical fusion    BACK SURGERY  2011    lumbar fusion    COLONOSCOPY  2009    CYSTOSCOPY      with stent ESWL x 2 procedure    HERNIA REPAIR Right     inguinal hernia HERNIA REPAIR Left 2013    inguinal hernia x 2 surgeries    KNEE ARTHROSCOPY Right     tear    LITHOTRIPSY Right 3/29/2016    SHOULDER ARTHROSCOPY Right 2005    TONSILLECTOMY       CURRENT MEDICATIONS       Previous Medications    ALLOPURINOL PO    Take by mouth    ATORVASTATIN (LIPITOR) 80 MG TABLET    Take 80 mg by mouth daily    DULOXETINE (CYMBALTA) 20 MG EXTENDED RELEASE CAPSULE    Take 20 mg by mouth daily    FENOFIBRATE (TRICOR) 145 MG TABLET    Take 145 mg by mouth daily    HYDROCHLOROTHIAZIDE (HYDRODIURIL) 25 MG TABLET    Take 25 mg by mouth daily    LISINOPRIL (PRINIVIL;ZESTRIL) 10 MG TABLET    Take 10 mg by mouth daily    METOPROLOL SUCCINATE (TOPROL XL) 25 MG EXTENDED RELEASE TABLET    Take 1 tablet by mouth daily    MULTIPLE VITAMINS-MINERALS (MULTIVITAL PO)    Take 1 tablet by mouth daily    NONFORMULARY    tropsium    ONDANSETRON (ZOFRAN ODT) 4 MG DISINTEGRATING TABLET    Take 1 tablet by mouth every 8 hours as needed for Nausea or Vomiting    TAMSULOSIN (FLOMAX) 0.4 MG CAPSULE    Take 1 capsule by mouth daily     ALLERGIES     has No Known Allergies. FAMILY HISTORY     has no family status information on file. SOCIAL HISTORY       Social History     Tobacco Use    Smoking status: Former    Smokeless tobacco: Never   Substance Use Topics    Alcohol use: Yes     Comment: social    Drug use: No     PHYSICAL EXAM     INITIAL VITALS: /76   Pulse 60   Temp 98.2 °F (36.8 °C)   Resp 15   Ht 6' (1.829 m)   Wt 220 lb (99.8 kg)   SpO2 96%   BMI 29.84 kg/m²    Physical Exam  Constitutional:       Appearance: Normal appearance. He is well-developed. He is not ill-appearing or toxic-appearing. HENT:      Head: Normocephalic and atraumatic. Eyes:      Conjunctiva/sclera: Conjunctivae normal.      Pupils: Pupils are equal, round, and reactive to light. Neck:      Trachea: Trachea normal.   Cardiovascular:      Rate and Rhythm: Normal rate and regular rhythm.       Heart sounds: S1 normal and S2 normal. No murmur heard. Pulmonary:      Effort: Pulmonary effort is normal. No accessory muscle usage or respiratory distress. Breath sounds: Normal breath sounds. Chest:      Chest wall: No deformity or tenderness. Abdominal:      General: Bowel sounds are normal. There is no distension or abdominal bruit. Palpations: Abdomen is not rigid. Tenderness: There is no abdominal tenderness. There is no guarding or rebound. Musculoskeletal:      Cervical back: Normal range of motion and neck supple. Skin:     General: Skin is warm. Findings: No rash. Neurological:      Mental Status: He is alert and oriented to person, place, and time. GCS: GCS eye subscore is 4. GCS verbal subscore is 5. GCS motor subscore is 6. Psychiatric:         Speech: Speech normal.       MEDICAL DECISION MAKIN-year-old male, syncopal episode, plan is basic labs cardiac enzymes EKG and reevaluation. 9:59 PM EDT  Patient is feeling significantly improved, there is no findings suggestive of acute coronary syndrome, he is awake and alert, oriented x3. Plan will be discharged with outpatient follow-up, return if symptoms worsen or change. HEART SCORE: not indicated  All patient's question's and concerns were answered prior to disposition and patient and/or family expressed understanding and agreement of treatment plan. CRITICAL CARE:              NIH STROKE SCALE:            PROCEDURES:    Procedures    DIAGNOSTIC RESULTS   EKG:All EKG's are interpreted by the Emergency Department Physician who either signs or Co-signs this chart in the absence of a cardiologist.    Patient's EKG shows sinus rhythm rate of 67 [place] QRS QTC intervals unremarkable patient has normal axis no ST elevations or depressions, patient has some nonspecific T wave changes in inferior leads, nonspecific EKG.     RADIOLOGY:All plain film, CT, MRI, and formal ultrasound images (except ED bedside ultrasound) are read by the radiologist, see reports below, unless otherwisenoted in MDM or here. XR CHEST PORTABLE   Final Result   No acute abnormality. LABS: All lab results were reviewed by myself, and all abnormals are listed below. Labs Reviewed   BASIC METABOLIC PANEL - Abnormal; Notable for the following components:       Result Value    Glucose 118 (*)     Creatinine 1.51 (*)     Est, Glom Filt Rate 51 (*)     Sodium 134 (*)     Chloride 96 (*)     All other components within normal limits   CBC WITH AUTO DIFFERENTIAL - Abnormal; Notable for the following components:    RBC 3.89 (*)     .2 (*)     MCH 34.4 (*)     Immature Granulocytes 1 (*)     All other components within normal limits   MAGNESIUM   TROPONIN   TROPONIN       EMERGENCY DEPARTMENTCOURSE:         Vitals:    Vitals:    10/15/22 2030 10/15/22 2100 10/15/22 2115 10/15/22 2130   BP: 99/64 111/79 118/81 110/76   Pulse: 61 59 63 60   Resp: 14  14 15   Temp:       SpO2: 95% 96% 95% 96%   Weight:       Height:           The patient was given the following medications while in the emergency department:  Orders Placed This Encounter   Medications    0.9 % sodium chloride bolus     CONSULTS:  None    FINAL IMPRESSION      1. Syncope and collapse          DISPOSITION/PLAN   DISPOSITION Decision To Discharge 10/15/2022 09:57:54 PM      PATIENT REFERRED TO:  Jamshid Shah MD  [address] [place] [address]  [phone]    Schedule an appointment as soon as possible for a visit in 2 days    DISCHARGE MEDICATIONS:  New Prescriptions    No medications on file     Coy Schuler MD  Attending Emergency Physician      The care is provided during an unprecedented national emergency due to the novel coronavirus, COVID 19.     This note was created with the assistance of a speech-recognition program. While intending to generate a document that actually reflects the content of the visit, no guarantees can be provided that every mistake has been identified and corrected by editing.     Berhane Sheffield MD  10/15/22 2509

## 2022-10-15 NOTE — ED TRIAGE NOTES
Patient presents to ED via EMS for evaluation of syncopal episode that happened today while out to eat dinner. Patient was seen here 2 days ago for HTN and metoprolol was restarted. Patient had episode per family report where he lost consciousness and stared blankly. Patient arrives alert and oriented, admits to having some drinks this evening at dinner. Denies chest pain or shortness of breath currently.

## 2022-10-17 LAB
EKG ATRIAL RATE: 67 BPM
EKG P AXIS: 41 DEGREES
EKG P-R INTERVAL: 164 MS
EKG Q-T INTERVAL: 416 MS
EKG QRS DURATION: 96 MS
EKG QTC CALCULATION (BAZETT): 439 MS
EKG R AXIS: 22 DEGREES
EKG T AXIS: -40 DEGREES
EKG VENTRICULAR RATE: 67 BPM